# Patient Record
Sex: MALE | Race: WHITE | NOT HISPANIC OR LATINO | Employment: FULL TIME | URBAN - METROPOLITAN AREA
[De-identification: names, ages, dates, MRNs, and addresses within clinical notes are randomized per-mention and may not be internally consistent; named-entity substitution may affect disease eponyms.]

---

## 2017-03-01 ENCOUNTER — TELEPHONE (OUTPATIENT)
Dept: INTERNAL MEDICINE | Facility: CLINIC | Age: 27
End: 2017-03-01

## 2017-03-01 NOTE — TELEPHONE ENCOUNTER
Spoke with pt to schedule appt. Itinerary has been mailed to pt's address on file. Pt has been notified and verbalized understanding of appt details.

## 2017-03-01 NOTE — TELEPHONE ENCOUNTER
----- Message from Maci Hernandez sent at 3/1/2017 12:48 PM CST -----  Contact: Pt can be reached at [phone]  / Max Oates) [phone]  Pt is calling to requesting to establish care and to check his left testicles that has an bump on it, no pain...    Requesting appt times any day after 2:00 p.m.    Thank you!

## 2017-03-07 ENCOUNTER — OFFICE VISIT (OUTPATIENT)
Dept: INTERNAL MEDICINE | Facility: CLINIC | Age: 27
End: 2017-03-07
Payer: COMMERCIAL

## 2017-03-07 ENCOUNTER — HOSPITAL ENCOUNTER (OUTPATIENT)
Dept: RADIOLOGY | Facility: HOSPITAL | Age: 27
Discharge: HOME | End: 2017-03-07
Attending: NURSE PRACTITIONER
Payer: COMMERCIAL

## 2017-03-07 ENCOUNTER — TELEPHONE (OUTPATIENT)
Dept: INTERNAL MEDICINE | Facility: CLINIC | Age: 27
End: 2017-03-07

## 2017-03-07 VITALS
HEART RATE: 91 BPM | SYSTOLIC BLOOD PRESSURE: 120 MMHG | OXYGEN SATURATION: 98 % | WEIGHT: 151.69 LBS | DIASTOLIC BLOOD PRESSURE: 74 MMHG | TEMPERATURE: 98 F | BODY MASS INDEX: 22.99 KG/M2 | HEIGHT: 68 IN

## 2017-03-07 DIAGNOSIS — N50.89 MASS OF LEFT TESTICLE: ICD-10-CM

## 2017-03-07 DIAGNOSIS — N50.89 MASS OF LEFT TESTICLE: Primary | ICD-10-CM

## 2017-03-07 DIAGNOSIS — N50.89 TESTICULAR MASS: Primary | ICD-10-CM

## 2017-03-07 PROCEDURE — 76870 US EXAM SCROTUM: CPT | Mod: 26,,, | Performed by: RADIOLOGY

## 2017-03-07 PROCEDURE — 99999 PR PBB SHADOW E&M-EST. PATIENT-LVL III: CPT | Mod: PBBFAC,,, | Performed by: NURSE PRACTITIONER

## 2017-03-07 PROCEDURE — 99213 OFFICE O/P EST LOW 20 MIN: CPT | Mod: S$GLB,,, | Performed by: NURSE PRACTITIONER

## 2017-03-07 PROCEDURE — 76870 US EXAM SCROTUM: CPT | Mod: TC

## 2017-03-07 PROCEDURE — 1160F RVW MEDS BY RX/DR IN RCRD: CPT | Mod: S$GLB,,, | Performed by: NURSE PRACTITIONER

## 2017-03-07 NOTE — PROGRESS NOTES
Subjective:       Patient ID: Junior Jj is a 26 y.o. male.    Chief Complaint: Groin Swelling (noticed lump L testicle)    Testicle Pain   The patient's primary symptoms include testicular pain. The patient's pertinent negatives include no scrotal swelling. This is a new problem. The current episode started in the past 7 days (3d). The problem occurs constantly. The problem has been gradually improving. The pain is mild. Pertinent negatives include no abdominal pain, chest pain, chills, constipation, coughing, diarrhea, dysuria, fever, frequency, headaches, nausea, rash, shortness of breath, sore throat, urgency or vomiting. The testicular pain affects the left testicle. There is swelling in the left testicle. The color of the testicles is normal. Exacerbated by: pressure or applying pressure. Treatments tried: warm baths helped. He is sexually active.     Review of Systems   Constitutional: Negative for activity change, appetite change, chills, fever and unexpected weight change.   HENT: Negative for congestion, ear discharge, ear pain, nosebleeds, sinus pressure, sneezing and sore throat.    Eyes: Negative for photophobia, discharge, redness, itching and visual disturbance.   Respiratory: Negative for cough, chest tightness, shortness of breath, wheezing and stridor.    Cardiovascular: Negative for chest pain, palpitations and leg swelling.   Gastrointestinal: Negative for abdominal distention, abdominal pain, blood in stool, constipation, diarrhea, nausea and vomiting.   Genitourinary: Positive for testicular pain. Negative for dysuria, frequency, hematuria, scrotal swelling and urgency.   Musculoskeletal: Negative for arthralgias, back pain, myalgias, neck pain and neck stiffness.   Skin: Negative for rash and wound.   Neurological: Negative for dizziness, light-headedness, numbness and headaches.   Hematological: Negative for adenopathy. Does not bruise/bleed easily.   Psychiatric/Behavioral: Negative  "for suicidal ideas.       Objective:      Physical Exam   Constitutional: He is oriented to person, place, and time. He appears well-developed and well-nourished.   HENT:   Head: Normocephalic and atraumatic.   Eyes: Pupils are equal, round, and reactive to light.   Neck: Normal range of motion.   Pulmonary/Chest: Effort normal. No respiratory distress.   Abdominal: He exhibits no distension.   Musculoskeletal: Normal range of motion.   Neurological: He is alert and oriented to person, place, and time.   Skin: Skin is warm and dry.       Assessment:       1. Mass of left testicle        Plan:       Junior was seen today for groin swelling.    Diagnoses and all orders for this visit:    Mass of left testicle  -     US Scrotum And Testicles; Future        Pt has been given instructions populated from [employer] database and has verbalized understanding of the after visit summary and information contained wherein.    Follow up with a primary care provider. May go to ER for acute shortness of breath, lightheadedness, fever, or any other emergent complaints or changes in condition.    "This note will be shared with the patient"    The [employer] medical Dictation software was used during the dictation of portions or the entirety of this medical record.  Phonetic or grammatic errors may exist due to the use of this software. For clarification, refer to the author of the document.             "

## 2017-03-07 NOTE — MR AVS SNAPSHOT
"    [facility]  [address]  [facility] [address]  Phone: [phone]  Fax: [phone]                  Juinor Jj   3/7/2017 2:00 PM   Office Visit    Description:  Male : 1990   Provider:  Javier Aguiar DNP   Department:  [facility] Medicine           Reason for Visit     Groin Swelling           Diagnoses this Visit        Comments    Mass of left testicle    -  Primary            To Do List           Future Appointments        Provider Department Dept Phone    3/7/2017 3:30 PM [facility] 11 ALL [facility] [phone]    2017 2:00 PM Mariza Moon MD [facility] [place] [phone]      Goals (5 Years of Data)     None      [facility]     [facility] -  Assistance  Registered nurses in the [facility] provide clinical advisement, health education, appointment booking, and other advisory services.  Call for this free service at [phone].             Medications           Message regarding Medications     Verify the changes and/or additions to your medication regime listed below are the same as discussed with your clinician today.  If any of these changes or additions are incorrect, please notify your healthcare provider.             Verify that the below list of medications is an accurate representation of the medications you are currently taking.  If none reported, the list may be blank. If incorrect, please contact your healthcare provider. Carry this list with you in case of emergency.                Clinical Reference Information           Your Vitals Were     BP Pulse Temp Height Weight SpO2    120/74 (BP Location: Left arm, Patient Position: Sitting, BP Method: Manual) 91 98 °F (36.7 °C) (Oral) 5' 8" (1.727 m) 68.8 kg (151 lb 10.8 oz) 98%    BMI                23.06 kg/m2          Blood Pressure          Most Recent Value    BP  120/74      Allergies as of " 3/7/2017     No Known Allergies      Immunizations Administered on Date of Encounter - 3/7/2017     None      Orders Placed During Today's Visit     Future Labs/Procedures Expected by Expires    US Scrotum And Testicles  3/7/2017 3/7/2018      Instructions      Testicular Self-Exam (BRENTON)  Testicular cancer is the most common form of cancer in men between the ages of 15 and 35. Most cases affect men under 55. It usually shows up as a painless lump in the testicle. The good news is that a simple monthly self-exam may help find trouble before it gets serious. When detected early, testicular cancer is almost 100% curable.       Doing Your BRENTON  Do a BRENTON once a month, during or after a warm shower. Spend about 3 minutes to 5 minutes feeling for lumps, firm areas, or changes. If you do find a problem, dont panic. Call your doctor and make an appointment.  Check the Testicles  Hold your scrotum in the palm of your hand. Roll each testicle gently between the thumbs and fingers of both hands. Feel for changes in each testicle, 1 at a time.  Check the Epididymis  The epididymis is a raised, rim-like structure responsible for sperm storage. It runs along the top and back of each testicle and often hurts when you press on it. Gently feel each epididymis for changes. A spermatocele, which is a cyst, can present as a painless growth near the testicle. These are noncancerous.  Check the Vas  The vas deferens is a little tube that runs up from the top of each testicle. A normal vas feels like a firm piece of cooked spaghetti. Feel for changes in the vas above each testicle.  Professional Screening  If you feel any abnormalities, tell your doctor right away. In addition to doing your own BRENTON, you should also see your doctor for regular checkups.  Date Last Reviewed: 11/1/2013  © 9401-3071 [employer]. [address] [place] [address], [place] [address]. All rights reserved. This information is not intended as a substitute for  professional medical care. Always follow your healthcare professional's instructions.             Language Assistance Services     ATTENTION: Language assistance services are available, free of charge. Please call [phone].      ATENCIÓN: Si habla josef, tiene a glover disposición servicios gratuitos de asistencia lingüística. Llame al [phone].     CHÚ Ý: N?u b?n nói Ti?ng Vi?t, có các d?ch v? h? tr? ngôn ng? mi?n phí dành cho b?n. G?i s? [phone].         [facility] complies with applicable Federal civil rights laws and does not discriminate on the basis of race, color, national origin, age, disability, or sex.

## 2017-03-07 NOTE — TELEPHONE ENCOUNTER
Please let the patient know that the abnormality we discussed in clinic was not visualized on ultrasound, which is a very, very good sign.  Lets schedule an appointment with urology for follow up and surveillance.

## 2021-07-06 ENCOUNTER — TELEPHONE (OUTPATIENT)
Dept: PODIATRY | Facility: CLINIC | Age: 31
End: 2021-07-06

## 2021-07-07 ENCOUNTER — OFFICE VISIT (OUTPATIENT)
Dept: URGENT CARE | Facility: CLINIC | Age: 31
End: 2021-07-07
Payer: COMMERCIAL

## 2021-07-07 VITALS
HEIGHT: 68 IN | OXYGEN SATURATION: 99 % | SYSTOLIC BLOOD PRESSURE: 119 MMHG | HEART RATE: 65 BPM | WEIGHT: 145 LBS | RESPIRATION RATE: 19 BRPM | TEMPERATURE: 98 F | DIASTOLIC BLOOD PRESSURE: 73 MMHG | BODY MASS INDEX: 21.98 KG/M2

## 2021-07-07 DIAGNOSIS — R21 RASH AND NONSPECIFIC SKIN ERUPTION: Primary | ICD-10-CM

## 2021-07-07 PROCEDURE — 99203 OFFICE O/P NEW LOW 30 MIN: CPT | Mod: S$GLB,,, | Performed by: FAMILY MEDICINE

## 2021-07-07 PROCEDURE — 3008F PR BODY MASS INDEX (BMI) DOCUMENTED: ICD-10-PCS | Mod: CPTII,S$GLB,, | Performed by: FAMILY MEDICINE

## 2021-07-07 PROCEDURE — 99203 PR OFFICE/OUTPT VISIT, NEW, LEVL III, 30-44 MIN: ICD-10-PCS | Mod: S$GLB,,, | Performed by: FAMILY MEDICINE

## 2021-07-07 PROCEDURE — 3008F BODY MASS INDEX DOCD: CPT | Mod: CPTII,S$GLB,, | Performed by: FAMILY MEDICINE

## 2021-07-07 RX ORDER — TRIAMCINOLONE ACETONIDE 5 MG/G
CREAM TOPICAL 2 TIMES DAILY
Qty: 15 G | Refills: 1 | Status: SHIPPED | OUTPATIENT
Start: 2021-07-07

## 2021-07-26 ENCOUNTER — IMMUNIZATION (OUTPATIENT)
Dept: FAMILY MEDICINE | Facility: CLINIC | Age: 31
End: 2021-07-26
Payer: COMMERCIAL

## 2021-07-26 DIAGNOSIS — Z23 NEED FOR VACCINATION: Primary | ICD-10-CM

## 2021-07-26 PROCEDURE — 0031A COVID-19,VECTOR-NR,RS-AD26,PF,0.5 ML DOSE VACCINE (JANSSEN): CPT | Mod: PBBFAC | Performed by: FAMILY MEDICINE

## 2021-11-01 ENCOUNTER — TELEPHONE (OUTPATIENT)
Dept: INTERNAL MEDICINE | Facility: CLINIC | Age: 31
End: 2021-11-01
Payer: COMMERCIAL

## 2021-11-01 ENCOUNTER — TELEPHONE (OUTPATIENT)
Dept: DERMATOLOGY | Facility: CLINIC | Age: 31
End: 2021-11-01
Payer: COMMERCIAL

## 2021-11-08 ENCOUNTER — OFFICE VISIT (OUTPATIENT)
Dept: DERMATOLOGY | Facility: CLINIC | Age: 31
End: 2021-11-08
Payer: COMMERCIAL

## 2021-11-08 ENCOUNTER — OFFICE VISIT (OUTPATIENT)
Dept: INTERNAL MEDICINE | Facility: CLINIC | Age: 31
End: 2021-11-08
Payer: COMMERCIAL

## 2021-11-08 VITALS
WEIGHT: 134.94 LBS | DIASTOLIC BLOOD PRESSURE: 68 MMHG | SYSTOLIC BLOOD PRESSURE: 116 MMHG | OXYGEN SATURATION: 99 % | HEART RATE: 74 BPM | HEIGHT: 68 IN | BODY MASS INDEX: 20.45 KG/M2

## 2021-11-08 DIAGNOSIS — Z76.89 ENCOUNTER FOR SKIN CARE: ICD-10-CM

## 2021-11-08 DIAGNOSIS — Z48.02 VISIT FOR SUTURE REMOVAL: Primary | ICD-10-CM

## 2021-11-08 DIAGNOSIS — L53.9 ERYTHEMA: Primary | ICD-10-CM

## 2021-11-08 DIAGNOSIS — L90.5 SCAR: ICD-10-CM

## 2021-11-08 PROCEDURE — 1160F RVW MEDS BY RX/DR IN RCRD: CPT | Mod: CPTII,S$GLB,, | Performed by: DERMATOLOGY

## 2021-11-08 PROCEDURE — 3008F BODY MASS INDEX DOCD: CPT | Mod: CPTII,S$GLB,, | Performed by: PHYSICIAN ASSISTANT

## 2021-11-08 PROCEDURE — 1160F PR REVIEW ALL MEDS BY PRESCRIBER/CLIN PHARMACIST DOCUMENTED: ICD-10-PCS | Mod: CPTII,S$GLB,, | Performed by: PHYSICIAN ASSISTANT

## 2021-11-08 PROCEDURE — 1160F PR REVIEW ALL MEDS BY PRESCRIBER/CLIN PHARMACIST DOCUMENTED: ICD-10-PCS | Mod: CPTII,S$GLB,, | Performed by: DERMATOLOGY

## 2021-11-08 PROCEDURE — 3078F DIAST BP <80 MM HG: CPT | Mod: CPTII,S$GLB,, | Performed by: PHYSICIAN ASSISTANT

## 2021-11-08 PROCEDURE — 1160F RVW MEDS BY RX/DR IN RCRD: CPT | Mod: CPTII,S$GLB,, | Performed by: PHYSICIAN ASSISTANT

## 2021-11-08 PROCEDURE — 1159F PR MEDICATION LIST DOCUMENTED IN MEDICAL RECORD: ICD-10-PCS | Mod: CPTII,S$GLB,, | Performed by: PHYSICIAN ASSISTANT

## 2021-11-08 PROCEDURE — 1159F MED LIST DOCD IN RCRD: CPT | Mod: CPTII,S$GLB,, | Performed by: DERMATOLOGY

## 2021-11-08 PROCEDURE — 1159F MED LIST DOCD IN RCRD: CPT | Mod: CPTII,S$GLB,, | Performed by: PHYSICIAN ASSISTANT

## 2021-11-08 PROCEDURE — 99203 OFFICE O/P NEW LOW 30 MIN: CPT | Mod: S$GLB,,, | Performed by: DERMATOLOGY

## 2021-11-08 PROCEDURE — 3078F PR MOST RECENT DIASTOLIC BLOOD PRESSURE < 80 MM HG: ICD-10-PCS | Mod: CPTII,S$GLB,, | Performed by: PHYSICIAN ASSISTANT

## 2021-11-08 PROCEDURE — 99999 PR PBB SHADOW E&M-EST. PATIENT-LVL III: ICD-10-PCS | Mod: PBBFAC,,, | Performed by: PHYSICIAN ASSISTANT

## 2021-11-08 PROCEDURE — 99203 PR OFFICE/OUTPT VISIT, NEW, LEVL III, 30-44 MIN: ICD-10-PCS | Mod: S$GLB,,, | Performed by: DERMATOLOGY

## 2021-11-08 PROCEDURE — 99999 PR PBB SHADOW E&M-EST. PATIENT-LVL III: CPT | Mod: PBBFAC,,, | Performed by: PHYSICIAN ASSISTANT

## 2021-11-08 PROCEDURE — 3074F SYST BP LT 130 MM HG: CPT | Mod: CPTII,S$GLB,, | Performed by: PHYSICIAN ASSISTANT

## 2021-11-08 PROCEDURE — 99999 PR PBB SHADOW E&M-EST. PATIENT-LVL II: CPT | Mod: PBBFAC,,, | Performed by: DERMATOLOGY

## 2021-11-08 PROCEDURE — 99213 OFFICE O/P EST LOW 20 MIN: CPT | Mod: S$GLB,,, | Performed by: PHYSICIAN ASSISTANT

## 2021-11-08 PROCEDURE — 99213 PR OFFICE/OUTPT VISIT, EST, LEVL III, 20-29 MIN: ICD-10-PCS | Mod: S$GLB,,, | Performed by: PHYSICIAN ASSISTANT

## 2021-11-08 PROCEDURE — 3008F PR BODY MASS INDEX (BMI) DOCUMENTED: ICD-10-PCS | Mod: CPTII,S$GLB,, | Performed by: PHYSICIAN ASSISTANT

## 2021-11-08 PROCEDURE — 99999 PR PBB SHADOW E&M-EST. PATIENT-LVL II: ICD-10-PCS | Mod: PBBFAC,,, | Performed by: DERMATOLOGY

## 2021-11-08 PROCEDURE — 1159F PR MEDICATION LIST DOCUMENTED IN MEDICAL RECORD: ICD-10-PCS | Mod: CPTII,S$GLB,, | Performed by: DERMATOLOGY

## 2021-11-08 PROCEDURE — 3074F PR MOST RECENT SYSTOLIC BLOOD PRESSURE < 130 MM HG: ICD-10-PCS | Mod: CPTII,S$GLB,, | Performed by: PHYSICIAN ASSISTANT
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
## 2025-01-25 ENCOUNTER — LAB VISIT (OUTPATIENT)
Dept: LAB | Facility: HOSPITAL | Age: 35
End: 2025-01-25
Attending: FAMILY MEDICINE
Payer: COMMERCIAL

## 2025-01-25 ENCOUNTER — OFFICE VISIT (OUTPATIENT)
Dept: INTERNAL MEDICINE | Facility: CLINIC | Age: 35
End: 2025-01-25
Payer: COMMERCIAL

## 2025-01-25 VITALS
WEIGHT: 139.13 LBS | SYSTOLIC BLOOD PRESSURE: 118 MMHG | DIASTOLIC BLOOD PRESSURE: 76 MMHG | OXYGEN SATURATION: 98 % | HEART RATE: 81 BPM | HEIGHT: 68 IN | BODY MASS INDEX: 21.09 KG/M2

## 2025-01-25 DIAGNOSIS — Z78.9 ALCOHOL USE: ICD-10-CM

## 2025-01-25 DIAGNOSIS — Z11.59 NEED FOR HEPATITIS C SCREENING TEST: ICD-10-CM

## 2025-01-25 DIAGNOSIS — Z82.49 FAMILY HISTORY OF HEART DISEASE: ICD-10-CM

## 2025-01-25 DIAGNOSIS — R10.13 MIDEPIGASTRIC PAIN: ICD-10-CM

## 2025-01-25 DIAGNOSIS — Z83.3 FAMILY HISTORY OF DIABETES MELLITUS: ICD-10-CM

## 2025-01-25 DIAGNOSIS — Z00.00 ANNUAL PHYSICAL EXAM: Primary | ICD-10-CM

## 2025-01-25 DIAGNOSIS — Z00.00 ANNUAL PHYSICAL EXAM: ICD-10-CM

## 2025-01-25 DIAGNOSIS — Z76.89 ENCOUNTER TO ESTABLISH CARE WITH NEW DOCTOR: ICD-10-CM

## 2025-01-25 LAB
ALBUMIN SERPL BCP-MCNC: 4.7 G/DL (ref 3.5–5.2)
ALP SERPL-CCNC: 51 U/L (ref 40–150)
ALT SERPL W/O P-5'-P-CCNC: 18 U/L (ref 10–44)
AMYLASE SERPL-CCNC: 35 U/L (ref 20–110)
ANION GAP SERPL CALC-SCNC: 13 MMOL/L (ref 8–16)
AST SERPL-CCNC: 17 U/L (ref 10–40)
BILIRUB SERPL-MCNC: 0.7 MG/DL (ref 0.1–1)
BUN SERPL-MCNC: 15 MG/DL (ref 6–20)
CALCIUM SERPL-MCNC: 10.2 MG/DL (ref 8.7–10.5)
CHLORIDE SERPL-SCNC: 102 MMOL/L (ref 95–110)
CHOLEST SERPL-MCNC: 215 MG/DL (ref 120–199)
CHOLEST/HDLC SERPL: 2.2 {RATIO} (ref 2–5)
CO2 SERPL-SCNC: 23 MMOL/L (ref 23–29)
CREAT SERPL-MCNC: 0.7 MG/DL (ref 0.5–1.4)
ERYTHROCYTE [DISTWIDTH] IN BLOOD BY AUTOMATED COUNT: 12.2 % (ref 11.5–14.5)
EST. GFR  (NO RACE VARIABLE): >60 ML/MIN/1.73 M^2
ESTIMATED AVG GLUCOSE: 91 MG/DL (ref 68–131)
FOLATE SERPL-MCNC: 18 NG/ML (ref 4–24)
GLUCOSE SERPL-MCNC: 89 MG/DL (ref 70–110)
HBA1C MFR BLD: 4.8 % (ref 4–5.6)
HCT VFR BLD AUTO: 42.3 % (ref 40–54)
HCV AB SERPL QL IA: NORMAL
HDLC SERPL-MCNC: 98 MG/DL (ref 40–75)
HDLC SERPL: 45.6 % (ref 20–50)
HGB BLD-MCNC: 14.3 G/DL (ref 14–18)
LDLC SERPL CALC-MCNC: 100.2 MG/DL (ref 63–159)
LIPASE SERPL-CCNC: 26 U/L (ref 4–60)
MAGNESIUM SERPL-MCNC: 2.1 MG/DL (ref 1.6–2.6)
MCH RBC QN AUTO: 31.2 PG (ref 27–31)
MCHC RBC AUTO-ENTMCNC: 33.8 G/DL (ref 32–36)
MCV RBC AUTO: 92 FL (ref 82–98)
NONHDLC SERPL-MCNC: 117 MG/DL
PLATELET # BLD AUTO: 277 K/UL (ref 150–450)
PMV BLD AUTO: 9.8 FL (ref 9.2–12.9)
POTASSIUM SERPL-SCNC: 4.2 MMOL/L (ref 3.5–5.1)
PROT SERPL-MCNC: 8.5 G/DL (ref 6–8.4)
RBC # BLD AUTO: 4.59 M/UL (ref 4.6–6.2)
SODIUM SERPL-SCNC: 138 MMOL/L (ref 136–145)
TESTOST SERPL-MCNC: 297 NG/DL (ref 304–1227)
TRIGL SERPL-MCNC: 84 MG/DL (ref 30–150)
TSH SERPL DL<=0.005 MIU/L-ACNC: 1.9 UIU/ML (ref 0.4–4)
VIT B12 SERPL-MCNC: 875 PG/ML (ref 210–950)
WBC # BLD AUTO: 5.75 K/UL (ref 3.9–12.7)

## 2025-01-25 PROCEDURE — 83036 HEMOGLOBIN GLYCOSYLATED A1C: CPT | Performed by: FAMILY MEDICINE

## 2025-01-25 PROCEDURE — 36415 COLL VENOUS BLD VENIPUNCTURE: CPT | Performed by: FAMILY MEDICINE

## 2025-01-25 PROCEDURE — 80061 LIPID PANEL: CPT | Performed by: FAMILY MEDICINE

## 2025-01-25 PROCEDURE — 85027 COMPLETE CBC AUTOMATED: CPT | Performed by: FAMILY MEDICINE

## 2025-01-25 PROCEDURE — 99999 PR PBB SHADOW E&M-NEW PATIENT-LVL IV: CPT | Mod: PBBFAC,,, | Performed by: FAMILY MEDICINE

## 2025-01-25 PROCEDURE — 83735 ASSAY OF MAGNESIUM: CPT | Performed by: FAMILY MEDICINE

## 2025-01-25 PROCEDURE — 84443 ASSAY THYROID STIM HORMONE: CPT | Performed by: FAMILY MEDICINE

## 2025-01-25 PROCEDURE — 82607 VITAMIN B-12: CPT | Performed by: FAMILY MEDICINE

## 2025-01-25 PROCEDURE — 82746 ASSAY OF FOLIC ACID SERUM: CPT | Performed by: FAMILY MEDICINE

## 2025-01-25 PROCEDURE — 3008F BODY MASS INDEX DOCD: CPT | Mod: CPTII,S$GLB,, | Performed by: FAMILY MEDICINE

## 2025-01-25 PROCEDURE — 86803 HEPATITIS C AB TEST: CPT | Performed by: FAMILY MEDICINE

## 2025-01-25 PROCEDURE — 1159F MED LIST DOCD IN RCRD: CPT | Mod: CPTII,S$GLB,, | Performed by: FAMILY MEDICINE

## 2025-01-25 PROCEDURE — 3078F DIAST BP <80 MM HG: CPT | Mod: CPTII,S$GLB,, | Performed by: FAMILY MEDICINE

## 2025-01-25 PROCEDURE — 83690 ASSAY OF LIPASE: CPT | Performed by: FAMILY MEDICINE

## 2025-01-25 PROCEDURE — 84403 ASSAY OF TOTAL TESTOSTERONE: CPT | Performed by: FAMILY MEDICINE

## 2025-01-25 PROCEDURE — 82150 ASSAY OF AMYLASE: CPT | Performed by: FAMILY MEDICINE

## 2025-01-25 PROCEDURE — 3074F SYST BP LT 130 MM HG: CPT | Mod: CPTII,S$GLB,, | Performed by: FAMILY MEDICINE

## 2025-01-25 PROCEDURE — 99214 OFFICE O/P EST MOD 30 MIN: CPT | Mod: 25,S$GLB,, | Performed by: FAMILY MEDICINE

## 2025-01-25 PROCEDURE — 80053 COMPREHEN METABOLIC PANEL: CPT | Performed by: FAMILY MEDICINE

## 2025-01-25 PROCEDURE — 99385 PREV VISIT NEW AGE 18-39: CPT | Mod: S$GLB,,, | Performed by: FAMILY MEDICINE

## 2025-01-25 RX ORDER — HYDROXYZINE HYDROCHLORIDE 25 MG/1
25 TABLET, FILM COATED ORAL 3 TIMES DAILY PRN
Qty: 30 TABLET | Refills: 1 | Status: SHIPPED | OUTPATIENT
Start: 2025-01-25

## 2025-01-25 RX ORDER — CYCLOBENZAPRINE HCL 5 MG
5 TABLET ORAL NIGHTLY
COMMUNITY
Start: 2025-01-18 | End: 2025-01-25

## 2025-01-25 RX ORDER — IBUPROFEN 800 MG/1
800 TABLET ORAL EVERY 8 HOURS PRN
COMMUNITY
Start: 2025-01-18

## 2025-02-10 NOTE — PROGRESS NOTES
Subjective:       Patient ID: Junior Jj is a 34 y.o. male.    Chief Complaint: No chief complaint on file.    HPI  The patient location is: [place]  The chief complaint leading to consultation is: checkup    Visit type: audiovisual    Face to Face time with patient: 10 min   30 minutes of total time spent on the encounter, which includes face to face time and non-face to face time preparing to see the patient (eg, review of tests), Obtaining and/or reviewing separately obtained history, Documenting clinical information in the electronic or other health record, Independently interpreting results (not separately reported) and communicating results to the patient/family/caregiver, or Care coordination (not separately reported).     Each patient to whom he or she provides medical services by telemedicine is:  (1) informed of the relationship between the physician and patient and the respective role of any other health care provider with respect to management of the patient; and (2) notified that he or she may decline to receive medical services by telemedicine and may withdraw from such care at any time.    Notes:     Junior Jj is a 34 y.o. male for virtual.    Has been trying to limit alcohol  Taking hydroxyzine prn with relief    Jaw pressure/tingling still present    Seems to be sleeping better    Review of Systems   Constitutional:  Negative for activity change and unexpected weight change.   HENT:  Negative for hearing loss, rhinorrhea and trouble swallowing.    Eyes:  Negative for discharge.   Respiratory:  Negative for chest tightness and wheezing.    Cardiovascular:  Negative for chest pain and palpitations.   Gastrointestinal:  Negative for constipation, diarrhea and vomiting.   Endocrine: Negative for polydipsia and polyuria.   Genitourinary:  Negative for difficulty urinating, hematuria and urgency.   Musculoskeletal:  Negative for arthralgias, joint swelling and neck pain.   Neurological:   Negative for headaches.   Psychiatric/Behavioral:  Negative for confusion and dysphoric mood.          History reviewed. No pertinent past medical history.     Prior to Admission medications    Medication Sig Start Date End Date Taking? Authorizing Provider   hydrOXYzine HCL (ATARAX) 25 MG tablet Take 1 tablet (25 mg total) by mouth 3 (three) times daily as needed for Anxiety. 1/25/25   Eladio Giraldo MD   ibuprofen (ADVIL,MOTRIN) 800 MG tablet Take 800 mg by mouth every 8 (eight) hours as needed. 1/18/25   Provider, Historical        Past medical history, surgical history, and family medical history reviewed and updated as appropriate.    Medications and allergies reviewed.     Objective:          There were no vitals filed for this visit.  There is no height or weight on file to calculate BMI.  Physical Exam  Constitutional:       General: He is not in acute distress.     Appearance: Normal appearance.   Eyes:      Extraocular Movements: Extraocular movements intact.   Pulmonary:      Effort: Pulmonary effort is normal. No respiratory distress.   Neurological:      Mental Status: He is alert and oriented to person, place, and time.   Psychiatric:         Mood and Affect: Mood normal.         Behavior: Behavior normal.         Lab Results   Component Value Date    WBC 5.75 01/25/2025    HGB 14.3 01/25/2025    HCT 42.3 01/25/2025     01/25/2025    CHOL 215 (H) 01/25/2025    TRIG 84 01/25/2025    HDL 98 (H) 01/25/2025    ALT 18 01/25/2025    AST 17 01/25/2025     01/25/2025    K 4.2 01/25/2025     01/25/2025    CREATININE 0.7 01/25/2025    BUN 15 01/25/2025    CO2 23 01/25/2025    TSH 1.897 01/25/2025    HGBA1C 4.8 01/25/2025       Assessment:       1. Stress    2. Elevated HDL    3. Alcohol use          Plan:   1. Stress    2. Elevated HDL    3. Alcohol use        Health maintenance reviewed with patient.     Follow up if symptoms worsen or fail to improve.    As this patient's primary care  physician, I am actively managing and/or treating his/her chronic medical conditions now and in the future. This includes, but is not limited to, medication management, coordination of care, documentation review from his/her specialists, and labs/imaging review that I have performed to prepare for this visit as well as will do so in the future as part of my care continuity for this patient.    Eladio Giraldo MD  Family Medicine / Primary Care  [facility]  2/11/2025

## 2025-02-11 ENCOUNTER — OFFICE VISIT (OUTPATIENT)
Dept: INTERNAL MEDICINE | Facility: CLINIC | Age: 35
End: 2025-02-11
Payer: COMMERCIAL

## 2025-02-11 DIAGNOSIS — Z78.9 ALCOHOL USE: ICD-10-CM

## 2025-02-11 DIAGNOSIS — E78.89 ELEVATED HDL: ICD-10-CM

## 2025-02-11 DIAGNOSIS — F43.9 STRESS: Primary | ICD-10-CM

## 2025-02-11 PROCEDURE — 1159F MED LIST DOCD IN RCRD: CPT | Mod: CPTII,95,, | Performed by: FAMILY MEDICINE

## 2025-02-11 PROCEDURE — 1160F RVW MEDS BY RX/DR IN RCRD: CPT | Mod: CPTII,95,, | Performed by: FAMILY MEDICINE

## 2025-02-11 PROCEDURE — 98006 SYNCH AUDIO-VIDEO EST MOD 30: CPT | Mod: 95,,, | Performed by: FAMILY MEDICINE

## 2025-02-11 PROCEDURE — 3044F HG A1C LEVEL LT 7.0%: CPT | Mod: CPTII,95,, | Performed by: FAMILY MEDICINE
